# Patient Record
Sex: FEMALE | Race: WHITE | NOT HISPANIC OR LATINO | Employment: FULL TIME | URBAN - METROPOLITAN AREA
[De-identification: names, ages, dates, MRNs, and addresses within clinical notes are randomized per-mention and may not be internally consistent; named-entity substitution may affect disease eponyms.]

---

## 2022-03-29 ENCOUNTER — HOSPITAL ENCOUNTER (EMERGENCY)
Facility: CLINIC | Age: 42
Discharge: HOME | End: 2022-03-29
Attending: EMERGENCY MEDICINE | Admitting: EMERGENCY MEDICINE
Payer: COMMERCIAL

## 2022-03-29 ENCOUNTER — APPOINTMENT (OUTPATIENT)
Dept: CT IMAGING | Facility: CLINIC | Age: 42
End: 2022-03-29
Attending: EMERGENCY MEDICINE
Payer: COMMERCIAL

## 2022-03-29 VITALS
HEART RATE: 81 BPM | TEMPERATURE: 97.4 F | DIASTOLIC BLOOD PRESSURE: 93 MMHG | OXYGEN SATURATION: 97 % | SYSTOLIC BLOOD PRESSURE: 147 MMHG | RESPIRATION RATE: 16 BRPM

## 2022-03-29 DIAGNOSIS — R10.13 ABDOMINAL PAIN, EPIGASTRIC: ICD-10-CM

## 2022-03-29 LAB
ALBUMIN SERPL-MCNC: 4.1 G/DL (ref 3.4–5)
ALP SERPL-CCNC: 69 U/L (ref 40–150)
ALT SERPL W P-5'-P-CCNC: 17 U/L (ref 0–50)
ANION GAP SERPL CALCULATED.3IONS-SCNC: 6 MMOL/L (ref 3–14)
AST SERPL W P-5'-P-CCNC: 12 U/L (ref 0–45)
BASOPHILS # BLD AUTO: 0 10E3/UL (ref 0–0.2)
BASOPHILS NFR BLD AUTO: 1 %
BILIRUB SERPL-MCNC: 0.5 MG/DL (ref 0.2–1.3)
BUN SERPL-MCNC: 12 MG/DL (ref 7–30)
CALCIUM SERPL-MCNC: 9 MG/DL (ref 8.5–10.1)
CHLORIDE BLD-SCNC: 108 MMOL/L (ref 94–109)
CO2 SERPL-SCNC: 24 MMOL/L (ref 20–32)
CREAT SERPL-MCNC: 0.63 MG/DL (ref 0.52–1.04)
EOSINOPHIL # BLD AUTO: 0.1 10E3/UL (ref 0–0.7)
EOSINOPHIL NFR BLD AUTO: 1 %
ERYTHROCYTE [DISTWIDTH] IN BLOOD BY AUTOMATED COUNT: 12.2 % (ref 10–15)
GFR SERPL CREATININE-BSD FRML MDRD: >90 ML/MIN/1.73M2
GLUCOSE BLD-MCNC: 100 MG/DL (ref 70–99)
HCT VFR BLD AUTO: 45.8 % (ref 35–47)
HGB BLD-MCNC: 15.4 G/DL (ref 11.7–15.7)
IMM GRANULOCYTES # BLD: 0 10E3/UL
IMM GRANULOCYTES NFR BLD: 0 %
LIPASE SERPL-CCNC: 94 U/L (ref 73–393)
LYMPHOCYTES # BLD AUTO: 1.5 10E3/UL (ref 0.8–5.3)
LYMPHOCYTES NFR BLD AUTO: 19 %
MCH RBC QN AUTO: 29.2 PG (ref 26.5–33)
MCHC RBC AUTO-ENTMCNC: 33.6 G/DL (ref 31.5–36.5)
MCV RBC AUTO: 87 FL (ref 78–100)
MONOCYTES # BLD AUTO: 0.4 10E3/UL (ref 0–1.3)
MONOCYTES NFR BLD AUTO: 5 %
NEUTROPHILS # BLD AUTO: 6.1 10E3/UL (ref 1.6–8.3)
NEUTROPHILS NFR BLD AUTO: 74 %
NRBC # BLD AUTO: 0 10E3/UL
NRBC BLD AUTO-RTO: 0 /100
PLATELET # BLD AUTO: 295 10E3/UL (ref 150–450)
POTASSIUM BLD-SCNC: 3.9 MMOL/L (ref 3.4–5.3)
PROT SERPL-MCNC: 7.6 G/DL (ref 6.8–8.8)
RBC # BLD AUTO: 5.28 10E6/UL (ref 3.8–5.2)
SODIUM SERPL-SCNC: 138 MMOL/L (ref 133–144)
WBC # BLD AUTO: 8.1 10E3/UL (ref 4–11)

## 2022-03-29 PROCEDURE — 250N000011 HC RX IP 250 OP 636: Performed by: EMERGENCY MEDICINE

## 2022-03-29 PROCEDURE — 96361 HYDRATE IV INFUSION ADD-ON: CPT | Performed by: EMERGENCY MEDICINE

## 2022-03-29 PROCEDURE — 96374 THER/PROPH/DIAG INJ IV PUSH: CPT | Mod: 59 | Performed by: EMERGENCY MEDICINE

## 2022-03-29 PROCEDURE — 74177 CT ABD & PELVIS W/CONTRAST: CPT

## 2022-03-29 PROCEDURE — C9113 INJ PANTOPRAZOLE SODIUM, VIA: HCPCS | Performed by: FAMILY MEDICINE

## 2022-03-29 PROCEDURE — 250N000009 HC RX 250: Performed by: EMERGENCY MEDICINE

## 2022-03-29 PROCEDURE — 96375 TX/PRO/DX INJ NEW DRUG ADDON: CPT | Performed by: EMERGENCY MEDICINE

## 2022-03-29 PROCEDURE — 250N000011 HC RX IP 250 OP 636: Performed by: FAMILY MEDICINE

## 2022-03-29 PROCEDURE — 83690 ASSAY OF LIPASE: CPT | Performed by: FAMILY MEDICINE

## 2022-03-29 PROCEDURE — 99285 EMERGENCY DEPT VISIT HI MDM: CPT | Mod: 25 | Performed by: EMERGENCY MEDICINE

## 2022-03-29 PROCEDURE — 85025 COMPLETE CBC W/AUTO DIFF WBC: CPT | Performed by: FAMILY MEDICINE

## 2022-03-29 PROCEDURE — 258N000003 HC RX IP 258 OP 636: Performed by: FAMILY MEDICINE

## 2022-03-29 PROCEDURE — 36415 COLL VENOUS BLD VENIPUNCTURE: CPT | Performed by: FAMILY MEDICINE

## 2022-03-29 PROCEDURE — 99284 EMERGENCY DEPT VISIT MOD MDM: CPT | Performed by: EMERGENCY MEDICINE

## 2022-03-29 PROCEDURE — 80053 COMPREHEN METABOLIC PANEL: CPT | Performed by: FAMILY MEDICINE

## 2022-03-29 RX ORDER — IOPAMIDOL 755 MG/ML
500 INJECTION, SOLUTION INTRAVASCULAR ONCE
Status: COMPLETED | OUTPATIENT
Start: 2022-03-29 | End: 2022-03-29

## 2022-03-29 RX ORDER — SUCRALFATE 1 G/1
1 TABLET ORAL 4 TIMES DAILY
Qty: 56 TABLET | Refills: 0 | Status: SHIPPED | OUTPATIENT
Start: 2022-03-29 | End: 2022-04-12

## 2022-03-29 RX ORDER — ONDANSETRON 2 MG/ML
4 INJECTION INTRAMUSCULAR; INTRAVENOUS EVERY 30 MIN PRN
Status: DISCONTINUED | OUTPATIENT
Start: 2022-03-29 | End: 2022-03-29 | Stop reason: HOSPADM

## 2022-03-29 RX ORDER — SODIUM CHLORIDE 9 MG/ML
INJECTION, SOLUTION INTRAVENOUS CONTINUOUS
Status: DISCONTINUED | OUTPATIENT
Start: 2022-03-29 | End: 2022-03-29 | Stop reason: HOSPADM

## 2022-03-29 RX ORDER — PANTOPRAZOLE SODIUM 40 MG/1
40 TABLET, DELAYED RELEASE ORAL DAILY
Qty: 30 TABLET | Refills: 0 | Status: SHIPPED | OUTPATIENT
Start: 2022-03-29 | End: 2022-04-28

## 2022-03-29 RX ADMIN — SODIUM CHLORIDE 1000 ML: 9 INJECTION, SOLUTION INTRAVENOUS at 07:14

## 2022-03-29 RX ADMIN — PANTOPRAZOLE SODIUM 40 MG: 40 INJECTION, POWDER, FOR SOLUTION INTRAVENOUS at 07:20

## 2022-03-29 RX ADMIN — SODIUM CHLORIDE 61 ML: 9 INJECTION, SOLUTION INTRAVENOUS at 09:05

## 2022-03-29 RX ADMIN — IOPAMIDOL 98 ML: 755 INJECTION, SOLUTION INTRAVENOUS at 09:06

## 2022-03-29 RX ADMIN — ONDANSETRON 4 MG: 2 INJECTION INTRAMUSCULAR; INTRAVENOUS at 07:21

## 2022-03-29 NOTE — DISCHARGE INSTRUCTIONS
Your medical work-up and description/location of your discomfort is concerning for possibility of peptic ulcer.  Recommendations:  1.  Stop all NSAIDs.  This includes ibuprofen, Advil, Motrin, Aleve 2.  2.  Stop would be good omeprazole  3.  Protonix 40 mg daily   4.  Add Carafate 1 g 4 times daily  5.  Primary care appointment -2 weeks  6.  Antacids such as Tums, Maalox, Mylanta can be used    If not improved with above care plan your primary care provider will consider upper endoscopy for direct visualization the esophagus, stomach and the first part of your small intestine called the duodenum.    Please avoid Pepto-Bismol.  This will turn your stools black to be hard to determine if you are having melena.  (Black tarry stools that can indicate GI bleeding.)

## 2022-03-29 NOTE — ED PROVIDER NOTES
"  History     Chief Complaint   Patient presents with     Abdominal Pain     HPI  Cindy Steele is a 41 year old female who presents with abdominal pain.  Epigastric.  Onset 3 AM.  Went to bed feeling fine.  Emesis x2.  Both had small streaks of blood.  No clots.  Initial emesis also had blood streaks.  Does use NSAIDs but no heavy use recently.  No known history for gastritis or PUD.  Similar epigastric pain that lasted 3days.  This occurred with the last few weeks and then spontaneously improved.  She has had no melena.  Normal bowel movements which for her not consistent a daily basis.  Status post cholecystectomy and umbilical hernia repair.  Denies fever chills or night sweats.  No chest discomfort or shortness of breath mentioned.    Allergies:  Allergies   Allergen Reactions     Amitriptyline Difficulty breathing     Flexeril [Cyclobenzaprine] Difficulty breathing     Isometheptene Difficulty breathing     Trazodone Difficulty breathing     Latex Hives     Amoxicillin Other (See Comments)     Reports \"terrible yeast infections\"     Doxycycline Other (See Comments)     Reports \"terrible yeast infections\"       Problem List:    There are no problems to display for this patient.       Past Medical History:    No past medical history on file.    Past Surgical History:    No past surgical history on file.    Family History:    No family history on file.    Social History:  Marital Status:   [2]  Social History     Tobacco Use     Smoking status: Not on file     Smokeless tobacco: Not on file   Substance Use Topics     Alcohol use: Not on file     Drug use: Not on file        Medications:    No current outpatient medications on file.        Review of Systems   All other systems reviewed and are negative.      Physical Exam   BP: (!) 147/93  Pulse: 81  Temp: 97.4  F (36.3  C)  Resp: 16  SpO2: 97 %      Physical Exam  Vitals and nursing note reviewed.   Constitutional:       Appearance: She is not " ill-appearing.   HENT:      Head: Normocephalic.      Nose: Nose normal.   Eyes:      Conjunctiva/sclera: Conjunctivae normal.   Cardiovascular:      Rate and Rhythm: Normal rate.   Pulmonary:      Effort: Pulmonary effort is normal.   Abdominal:      General: Abdomen is flat. Bowel sounds are normal.      Palpations: Abdomen is soft. There is no hepatomegaly or splenomegaly.      Tenderness: There is abdominal tenderness in the epigastric area and periumbilical area. There is no guarding or rebound.      Hernia: No hernia is present.   Skin:     General: Skin is warm.      Capillary Refill: Capillary refill takes less than 2 seconds.      Findings: No rash.   Neurological:      General: No focal deficit present.      Mental Status: She is alert and oriented to person, place, and time.   Psychiatric:         Mood and Affect: Mood normal.         Behavior: Behavior normal.         ED Course                 Procedures                  Results for orders placed or performed during the hospital encounter of 03/29/22 (from the past 24 hour(s))   CBC with platelets differential    Narrative    The following orders were created for panel order CBC with platelets differential.  Procedure                               Abnormality         Status                     ---------                               -----------         ------                     CBC with platelets and d...[[id number]]  Abnormal            Final result                 Please view results for these tests on the individual orders.   Comprehensive metabolic panel   Result Value Ref Range    Sodium 138 133 - 144 mmol/L    Potassium 3.9 3.4 - 5.3 mmol/L    Chloride 108 94 - 109 mmol/L    Carbon Dioxide (CO2) 24 20 - 32 mmol/L    Anion Gap 6 3 - 14 mmol/L    Urea Nitrogen 12 7 - 30 mg/dL    Creatinine 0.63 0.52 - 1.04 mg/dL    Calcium 9.0 8.5 - 10.1 mg/dL    Glucose 100 (H) 70 - 99 mg/dL    Alkaline Phosphatase 69 40 - 150 U/L    AST 12 0 - 45 U/L    ALT 17 0 -  50 U/L    Protein Total 7.6 6.8 - 8.8 g/dL    Albumin 4.1 3.4 - 5.0 g/dL    Bilirubin Total 0.5 0.2 - 1.3 mg/dL    GFR Estimate >90 >60 mL/min/1.73m2   Lipase   Result Value Ref Range    Lipase 94 73 - 393 U/L   CBC with platelets and differential   Result Value Ref Range    WBC Count 8.1 4.0 - 11.0 10e3/uL    RBC Count 5.28 (H) 3.80 - 5.20 10e6/uL    Hemoglobin 15.4 11.7 - 15.7 g/dL    Hematocrit 45.8 35.0 - 47.0 %    MCV 87 78 - 100 fL    MCH 29.2 26.5 - 33.0 pg    MCHC 33.6 31.5 - 36.5 g/dL    RDW 12.2 10.0 - 15.0 %    Platelet Count 295 150 - 450 10e3/uL    % Neutrophils 74 %    % Lymphocytes 19 %    % Monocytes 5 %    % Eosinophils 1 %    % Basophils 1 %    % Immature Granulocytes 0 %    NRBCs per 100 WBC 0 <1 /100    Absolute Neutrophils 6.1 1.6 - 8.3 10e3/uL    Absolute Lymphocytes 1.5 0.8 - 5.3 10e3/uL    Absolute Monocytes 0.4 0.0 - 1.3 10e3/uL    Absolute Eosinophils 0.1 0.0 - 0.7 10e3/uL    Absolute Basophils 0.0 0.0 - 0.2 10e3/uL    Absolute Immature Granulocytes 0.0 <=0.4 10e3/uL    Absolute NRBCs 0.0 10e3/uL   CT Abdomen Pelvis w Contrast    Narrative    CT ABDOMEN PELVIS W CONTRAST 3/29/2022 9:13 AM    CLINICAL HISTORY: Epigastric pain    TECHNIQUE: CT scan of the abdomen and pelvis was performed following  injection of IV contrast. Multiplanar reformats were obtained. Dose  reduction techniques were used.  CONTRAST: [id number]    COMPARISON: None.    FINDINGS:   LOWER CHEST: Noncalcified solid medial right lower lobe nodule  measuring 0.4 x 0.2 cm (series 3, image 35).    HEPATOBILIARY: Subcentimeter hepatic dome hypodensity is too small to  be characterized. No biliary dilatation. Cholecystectomy.    PANCREAS: Homogeneous enhancement. No focal pancreatic mass. No  peripancreatic inflammatory changes. No pancreatic ductal dilatation.    SPLEEN: Normal size.    ADRENAL GLANDS: No nodules.    KIDNEYS/BLADDER: Symmetric enhancement of the kidneys. No suspicious  renal mass. No collecting system  dilatation. The urinary bladder with  thickening.    BOWEL: Unremarkable appearance of the distal esophagus, stomach, and  duodenum. No dilated loops of small bowel to suggest an obstruction.  Normal caliber appendix. Moderate colonic fecal loading. Otherwise,  unremarkable appearance of the colon.    PELVIC ORGANS: Prior hysterectomy. 2.1 cm right ovarian follicle for  which no follow-up is recommended.    ADDITIONAL FINDINGS: Normal caliber abdominal aorta. Patent portal,  splenic, and superior mesenteric veins. Patent bilateral renal veins  and no abdominopelvic lymphadenopathy. No ascites.    MUSCULOSKELETAL: Aggressive osseous lesion.      Impression    IMPRESSION:   1.  Moderate colonic fecal loading.  2.  Noncalcified right lower lobe nodule measuring 0.3 cm in main  axial diameter. If desired, a follow-up CT chest can be obtained in  one year based on Fleischner criteria.    AZIZA WESTON MD         SYSTEM ID:  [id number]       Medications   0.9% sodium chloride BOLUS (has no administration in time range)     Followed by   sodium chloride 0.9% infusion (has no administration in time range)   ondansetron (ZOFRAN) injection 4 mg (has no administration in time range)   pantoprazole (PROTONIX) IV push injection 40 mg (has no administration in time range)       Assessments & Plan (with Medical Decision Making)  41-year-old female.  Presents epigastric pain.  Experiencing postprandial epigastric discomfort.  She status post cholecystectomy years ago.  This morning had to emesis around 3 AM and had some blood streaks in the emesis.  Small fingernail-sized clot.  She has had no melena  Present with a /93.  Pulse of 81.  Patient was not orthostatic.  Skin color and tone appeared normal.  She was not pale.  Abdomen noted localized subxiphoid epigastric pain.  Did not extend to the left right upper quadrant.  There is no guarding or rebound.   Laboratory work-up identified a hemoglobin of 15.4.  Normal  platelet count.  Comprehensive metabolic panel is normal.  Hepatic studies and lipase normal.  CT of abdomen and pelvis showed no acute intra abdominal process your child for pain.  Plan: Symptoms appear to be consistent with gastritis.  She is using NSAIDs.  Advised to stop NSAIDs.  May use Tylenol.  Stop omeprazole.  Switch to Protonix 40 mg daily.  Add Carafate 1 g 4 times daily.  2 weeks of medications and recheck in the clinic.  If not improving I would recommend upper endoscopy.  If starts having melena she needs to see her doctor for hemoglobin recheck.       I have reviewed the nursing notes.    I have reviewed the findings, diagnosis, plan and need for follow up with the patient.      New Prescriptions    No medications on file       Final diagnoses:   Abdominal pain, epigastric       3/29/2022   [facility] EMERGENCY DEPT     Meir Smith,   03/29/22 1003

## 2022-03-29 NOTE — ED TRIAGE NOTES
Patient presents with epigastric pain that started around 0300 tonight. Endorses vomiting x2, states she noticed blood in the emesis. No fevers.

## 2023-01-09 ENCOUNTER — APPOINTMENT (OUTPATIENT)
Dept: GENERAL RADIOLOGY | Facility: CLINIC | Age: 43
End: 2023-01-09
Attending: EMERGENCY MEDICINE
Payer: OTHER MISCELLANEOUS

## 2023-01-09 ENCOUNTER — HOSPITAL ENCOUNTER (EMERGENCY)
Facility: CLINIC | Age: 43
Discharge: HOME | End: 2023-01-09
Attending: EMERGENCY MEDICINE | Admitting: EMERGENCY MEDICINE
Payer: OTHER MISCELLANEOUS

## 2023-01-09 VITALS
SYSTOLIC BLOOD PRESSURE: 128 MMHG | WEIGHT: 206 LBS | RESPIRATION RATE: 20 BRPM | TEMPERATURE: 98.3 F | DIASTOLIC BLOOD PRESSURE: 88 MMHG | OXYGEN SATURATION: 98 % | HEART RATE: 74 BPM

## 2023-01-09 DIAGNOSIS — S80.01XA PATELLAR CONTUSION, RIGHT, INITIAL ENCOUNTER: ICD-10-CM

## 2023-01-09 PROCEDURE — 73562 X-RAY EXAM OF KNEE 3: CPT | Mod: RT

## 2023-01-09 PROCEDURE — 99283 EMERGENCY DEPT VISIT LOW MDM: CPT | Performed by: EMERGENCY MEDICINE

## 2023-01-09 NOTE — DISCHARGE INSTRUCTIONS
X-rays show no internal derangement or fracture.  Her examination is consistent with a direct blow when falling causing soft tissue swelling and a contusion to the patella (kneecap).  No limitation in activity.  Will be tender to bear weight directly on the knee such as kneeling.  Would recommend applying ice as needed for swelling and pain reduction.  May return to work without restrictions.

## 2023-01-09 NOTE — ED PROVIDER NOTES
"  History     Chief Complaint   Patient presents with     Knee Pain     HPI   Registered as Worker's Compensation related injury.    Cindy Steele is a 42 year old female who presents with acute right knee pain.  She fell over the weekend was flexed and degrees landing right on the patella.  She has noted some soft tissue swelling with bruising and also feels a slight change in the contour to the patella.  She will bear full weight.    Allergies:  Allergies   Allergen Reactions     Amitriptyline Difficulty breathing     Flexeril [Cyclobenzaprine] Difficulty breathing     Isometheptene Difficulty breathing     Trazodone Difficulty breathing     Latex Hives     Amoxicillin Other (See Comments)     Reports \"terrible yeast infections\"     Doxycycline Other (See Comments)     Reports \"terrible yeast infections\"       Problem List:    There are no problems to display for this patient.       Past Medical History:    History reviewed. No pertinent past medical history.    Past Surgical History:    History reviewed. No pertinent surgical history.    Family History:    No family history on file.    Social History:  Marital Status:   [2]        Medications:    No current outpatient medications on file.        Review of Systems   All other systems reviewed and are negative.      Physical Exam   BP: (!) 131/96  Pulse: 73  Temp: 98.3  F (36.8  C)  Resp: 20  Weight: 93.4 kg (206 lb)  SpO2: 98 %      Physical Exam  Vitals and nursing note reviewed.   Constitutional:       Appearance: She is not ill-appearing.   HENT:      Head: Normocephalic.      Nose: Nose normal.   Eyes:      Conjunctiva/sclera: Conjunctivae normal.   Cardiovascular:      Rate and Rhythm: Normal rate.   Pulmonary:      Effort: Pulmonary effort is normal.   Musculoskeletal:      Comments: Right knee:  Prepatellar contusion  Tenderness palpating over the central patella  Patella is tracking properly  Full flexion and extension  Ligaments intact  No " recognized joint effusion     Skin:     General: Skin is warm.      Capillary Refill: Capillary refill takes less than 2 seconds.      Findings: No rash.   Neurological:      General: No focal deficit present.      Mental Status: She is alert and oriented to person, place, and time.   Psychiatric:         Mood and Affect: Mood normal.         Behavior: Behavior normal.         ED Course                 Procedures                  Results for orders placed or performed during the hospital encounter of 01/09/23 (from the past 24 hour(s))   XR Knee Right 3 Views    Narrative    KNEE RIGHT THREE VIEWS January 9, 2023 9:57 AM    INDICATION: Fall related injury.    COMPARISON: None available.       Impression    IMPRESSION: Normal joint spaces and alignment. No definite fracture.  No right knee joint effusion.       MALISSA MUSTAFA MD         SYSTEM ID:  [id number]       Medications - No data to display    Assessments & Plan (with Medical Decision Making)  Fall at home over the weekend and ice  Isolate injury to the right knee.  Complaining of pain over the prepatellar area.  Able to bear full weight.  Examination none noted and no joint effusion.  There is some bruising over the patella.  The central patella on exam was mildly tender.  No significant pain with compression of the patella to the femoral condyles.  Ligament assessment for MCL, PCL, ACL, LCL intact.  Displayed normal range of motion of the knee.  Full weightbearing.  X-rays identified normal joint space and alignment with no signs for fracture and joint effusion.  Exam appears to be consistent with soft tissue contusion that is prepatellar.  Discharge home with weightbearing activity as tolerated.  She can apply ice to reduce swelling and discomfort as needed.  Work note provided for today.       I have reviewed the nursing notes.    I have reviewed the findings, diagnosis, plan and need for follow up with the patient.            There are no discharge  medications for this patient.      Final diagnoses:   Patellar contusion, right, initial encounter       1/9/2023   [facility] EMERGENCY DEPT     Meir Smith, DO  01/09/23 1049       Meir Smith, DO  01/09/23 1115

## 2023-01-09 NOTE — LETTER
January 9, 2023      To Whom It May Concern:      Cindy Steele was seen in our Emergency Department today, 01/09/23.  Patient was seen for a nonwork related fall.  She sustained a contusion to the right knee/patella.  She may return to work tomorrow without restrictions other than should should not be doing a lot of direct pressure to the kneecap by avoiding kneeling.      Sincerely,        Meir Smith Dr. [facility] ED Staff Physician

## 2023-01-09 NOTE — LETTER
January 9, 2023      To Whom It May Concern:      Cindy Steele was seen in our Emergency Department today, 01/09/23.   Patient was seen for a fall that is work-related.  She sustained a contusion to the right knee both soft tissue and patella.  Fortunately x-rays showed no signs for any fracture or change in alignment.  Examination noted no internal derangement to suggest any ligament disruption.  She is allowed to bear full weight.  Limitation would be to avoid putting direct pressure on the knee such as when kneeling.  She can use ibuprofen or Tylenol for discomfort and apply ice as needed for swelling and discomfort.        Sincerely,    Meir Smith Dr. [facility] ED Staff Physician

## 2023-01-09 NOTE — ED TRIAGE NOTES
"Patient states she fell on her right knee at work on Saturday. She feels a \"ridge\" in her knee and pain is also to the back of knee.     Triage Assessment     Row Name 01/09/23 0944       Triage Assessment (Adult)    Airway WDL WDL       Respiratory WDL    Respiratory WDL WDL       Skin Circulation/Temperature WDL    Skin Circulation/Temperature WDL WDL       Cardiac WDL    Cardiac WDL WDL              "

## 2023-01-19 ENCOUNTER — OFFICE VISIT (OUTPATIENT)
Dept: URGENT CARE | Facility: URGENT CARE | Age: 43
End: 2023-01-19
Payer: COMMERCIAL

## 2023-01-19 VITALS
BODY MASS INDEX: 32.82 KG/M2 | OXYGEN SATURATION: 98 % | TEMPERATURE: 98.2 F | SYSTOLIC BLOOD PRESSURE: 138 MMHG | WEIGHT: 204.2 LBS | HEIGHT: 66 IN | DIASTOLIC BLOOD PRESSURE: 89 MMHG | HEART RATE: 83 BPM

## 2023-01-19 DIAGNOSIS — H10.32 ACUTE CONJUNCTIVITIS OF LEFT EYE, UNSPECIFIED ACUTE CONJUNCTIVITIS TYPE: Primary | ICD-10-CM

## 2023-01-19 PROCEDURE — 99203 OFFICE O/P NEW LOW 30 MIN: CPT

## 2023-01-19 RX ORDER — LISINOPRIL 5 MG/1
1 TABLET ORAL DAILY
COMMUNITY
Start: 2022-04-04

## 2023-01-19 RX ORDER — BIOTIN 10000 MCG
CAPSULE ORAL
COMMUNITY

## 2023-01-19 RX ORDER — VITAMIN B COMPLEX
1 TABLET ORAL DAILY
COMMUNITY

## 2023-01-19 RX ORDER — CIPROFLOXACIN HYDROCHLORIDE 3.5 MG/ML
1 SOLUTION/ DROPS TOPICAL 4 TIMES DAILY
Qty: 1 ML | Refills: 0 | Status: SHIPPED | OUTPATIENT
Start: 2023-01-19 | End: 2023-01-24

## 2023-01-19 RX ORDER — ESOMEPRAZOLE MAGNESIUM 40 MG/1
40 CAPSULE, DELAYED RELEASE ORAL
COMMUNITY
Start: 2021-07-22

## 2023-01-19 RX ORDER — ALBUTEROL SULFATE 90 UG/1
1-2 AEROSOL, METERED RESPIRATORY (INHALATION)
COMMUNITY
Start: 2022-08-24

## 2023-01-19 NOTE — LETTER
January 19, 2023      Cindy Steele  [address] [place] [address]        To Whom It May Concern:    Cindy Steele  was seen on January 19, 2023.  Please excuse her from work until January 20th due to illness.        Sincerely,        KARINA Augustin CNP

## 2023-01-19 NOTE — PATIENT INSTRUCTIONS
Use the antibiotic eyedrops as prescribed and finish the full course even if symptoms improve.  Wear glasses until you have completed the five days of treatment.

## 2023-01-19 NOTE — PROGRESS NOTES
"ASSESSMENT:  (H10.32) Acute conjunctivitis of left eye, unspecified acute conjunctivitis type  (primary encounter diagnosis)  Plan: ciprofloxacin (CILOXAN) 0.3 % ophthalmic         solution    PLAN:  Conjunctivitis caused by infection patient instructions discussed and provided.  Informed the patient to use the antibiotic eyedrops as prescribed and finish the full course even if symptoms improve.  We also discussed the need for her to wear her glasses until she has completed the full 5 days of treatment.  Work note provided.  Discussed the need to return to clinic with any new or worsening symptoms.  Patient acknowledged her understanding of the above plan.    Neto Blue, KARINA CNP    SUBJECTIVE:  Cindy Steele is a 42 year old female who presents complaining of moderate left eye discomfort, itching and watering for 2 day(s).  The patient reports having her left eye swollen shut today with hard crust over it. She indicates that she had difficulty removing her left contact lens yesterday.  She reported that she was successful late last night and removing the left contact lens.  She denies any vision changes.  Details:  Associated Signs and Syptoms: none  Treatment measures tried include: pink eye relief drops  Contact wearer : Yes     ROS:  ROS neg other than the symptoms noted above in the HPI.    OBJECTIVE:  /89 (BP Location: Left arm, Patient Position: Sitting, Cuff Size: Adult Large)   Pulse 83   Temp 98.2  F (36.8  C) (Tympanic)   Ht 1.676 m (5' 6\")   Wt 92.6 kg (204 lb 3.2 oz)   SpO2 98%   BMI 32.96 kg/m    General: no acute distress  Left eye exam: lids normal; PERRL, EOM's intact; mild conjunctival injection and increased tearing compared to right eye.  Patient is currently wearing glasses  "

## 2023-02-12 ENCOUNTER — HEALTH MAINTENANCE LETTER (OUTPATIENT)
Age: 43
End: 2023-02-12

## 2023-08-13 ENCOUNTER — HEALTH MAINTENANCE LETTER (OUTPATIENT)
Age: 43
End: 2023-08-13

## 2024-03-10 ENCOUNTER — HEALTH MAINTENANCE LETTER (OUTPATIENT)
Age: 44
End: 2024-03-10

## 2024-03-29 ENCOUNTER — HOSPITAL ENCOUNTER (EMERGENCY)
Facility: CLINIC | Age: 44
Discharge: HOME | End: 2024-03-29
Attending: STUDENT IN AN ORGANIZED HEALTH CARE EDUCATION/TRAINING PROGRAM | Admitting: STUDENT IN AN ORGANIZED HEALTH CARE EDUCATION/TRAINING PROGRAM
Payer: COMMERCIAL

## 2024-03-29 ENCOUNTER — APPOINTMENT (OUTPATIENT)
Dept: CT IMAGING | Facility: CLINIC | Age: 44
End: 2024-03-29
Attending: STUDENT IN AN ORGANIZED HEALTH CARE EDUCATION/TRAINING PROGRAM
Payer: COMMERCIAL

## 2024-03-29 VITALS
SYSTOLIC BLOOD PRESSURE: 141 MMHG | RESPIRATION RATE: 16 BRPM | HEART RATE: 80 BPM | OXYGEN SATURATION: 100 % | TEMPERATURE: 97.5 F | BODY MASS INDEX: 32.28 KG/M2 | DIASTOLIC BLOOD PRESSURE: 98 MMHG | WEIGHT: 200 LBS

## 2024-03-29 DIAGNOSIS — M54.6 ACUTE BILATERAL THORACIC BACK PAIN: ICD-10-CM

## 2024-03-29 PROCEDURE — 72131 CT LUMBAR SPINE W/O DYE: CPT

## 2024-03-29 PROCEDURE — 99284 EMERGENCY DEPT VISIT MOD MDM: CPT | Mod: 25 | Performed by: STUDENT IN AN ORGANIZED HEALTH CARE EDUCATION/TRAINING PROGRAM

## 2024-03-29 PROCEDURE — 99284 EMERGENCY DEPT VISIT MOD MDM: CPT | Performed by: STUDENT IN AN ORGANIZED HEALTH CARE EDUCATION/TRAINING PROGRAM

## 2024-03-29 PROCEDURE — 250N000013 HC RX MED GY IP 250 OP 250 PS 637: Performed by: STUDENT IN AN ORGANIZED HEALTH CARE EDUCATION/TRAINING PROGRAM

## 2024-03-29 RX ORDER — IBUPROFEN 600 MG/1
600 TABLET, FILM COATED ORAL ONCE
Status: COMPLETED | OUTPATIENT
Start: 2024-03-29 | End: 2024-03-29

## 2024-03-29 RX ORDER — OXYCODONE HYDROCHLORIDE 5 MG/1
5 TABLET ORAL EVERY 6 HOURS PRN
Qty: 12 TABLET | Refills: 0 | Status: SHIPPED | OUTPATIENT
Start: 2024-03-29 | End: 2024-04-01

## 2024-03-29 RX ORDER — ACETAMINOPHEN 500 MG
1000 TABLET ORAL ONCE
Status: COMPLETED | OUTPATIENT
Start: 2024-03-29 | End: 2024-03-29

## 2024-03-29 RX ORDER — METHYLPREDNISOLONE 4 MG
TABLET, DOSE PACK ORAL
Qty: 21 TABLET | Refills: 0 | Status: SHIPPED | OUTPATIENT
Start: 2024-03-29

## 2024-03-29 RX ORDER — LORAZEPAM 1 MG/1
0.5 TABLET ORAL EVERY 6 HOURS PRN
Qty: 5 TABLET | Refills: 0 | Status: SHIPPED | OUTPATIENT
Start: 2024-03-29 | End: 2024-04-04

## 2024-03-29 RX ADMIN — IBUPROFEN 600 MG: 600 TABLET, FILM COATED ORAL at 04:39

## 2024-03-29 RX ADMIN — ACETAMINOPHEN 1000 MG: 500 TABLET ORAL at 04:39

## 2024-03-29 ASSESSMENT — ENCOUNTER SYMPTOMS
NECK STIFFNESS: 0
APPETITE CHANGE: 0
BACK PAIN: 1
DIZZINESS: 0
ACTIVITY CHANGE: 0
NAUSEA: 0
FEVER: 0
SORE THROAT: 0
SHORTNESS OF BREATH: 0
HEADACHES: 0
RHINORRHEA: 0
FATIGUE: 0
HEMATURIA: 0
DYSURIA: 0
ARTHRALGIAS: 0
DIARRHEA: 0
COUGH: 0
VOMITING: 0
CHILLS: 0
ABDOMINAL PAIN: 0
MYALGIAS: 0
EYE REDNESS: 0

## 2024-03-29 ASSESSMENT — COLUMBIA-SUICIDE SEVERITY RATING SCALE - C-SSRS
1. IN THE PAST MONTH, HAVE YOU WISHED YOU WERE DEAD OR WISHED YOU COULD GO TO SLEEP AND NOT WAKE UP?: NO
2. HAVE YOU ACTUALLY HAD ANY THOUGHTS OF KILLING YOURSELF IN THE PAST MONTH?: NO
6. HAVE YOU EVER DONE ANYTHING, STARTED TO DO ANYTHING, OR PREPARED TO DO ANYTHING TO END YOUR LIFE?: NO

## 2024-03-29 ASSESSMENT — ACTIVITIES OF DAILY LIVING (ADL)
ADLS_ACUITY_SCORE: 35
ADLS_ACUITY_SCORE: 35

## 2024-03-29 NOTE — ED TRIAGE NOTES
"Patient injured her back Monday while shoveling snow. She reports it \"popping\" this morning with increased pain.     Triage Assessment (Adult)       Row Name 03/29/24 0344          Triage Assessment    Airway WDL WDL        Respiratory WDL    Respiratory WDL WDL        Skin Circulation/Temperature WDL    Skin Circulation/Temperature WDL WDL        Cardiac WDL    Cardiac WDL WDL        Peripheral/Neurovascular WDL    Peripheral Neurovascular WDL WDL        Cognitive/Neuro/Behavioral WDL    Cognitive/Neuro/Behavioral WDL WDL                     "

## 2024-03-29 NOTE — ED PROVIDER NOTES
"  History     Chief Complaint   Patient presents with    Back Pain     HPI  Cindy Steele is a 43 year old female senting with back pain.  She notes that on Monday she was shoveling snow when she heard a pop in her back.  She has been stiff otherwise wise since then however yesterday she felt better and try to shave her legs and when trying to stand up again for another loud pop again.  She tried to rest for the majority of yesterday in the evening and while she tried to stand at this point to go to work she had significant pain in her lumbar spine.  She has no loss of sensation of her groin or loss of bowel or bladder habits.  She has no numbness tingling of her extremities.  Pain is localized to the lumbar area.    Allergies:  Allergies   Allergen Reactions    Amitriptyline Difficulty breathing    Flexeril [Cyclobenzaprine] Difficulty breathing    Isometheptene Difficulty breathing    Trazodone Difficulty breathing    Latex Hives    Amoxicillin Other (See Comments)     Reports \"terrible yeast infections\"    Doxycycline Other (See Comments)     Reports \"terrible yeast infections\"       Problem List:    There are no problems to display for this patient.       Past Medical History:    No past medical history on file.    Past Surgical History:    No past surgical history on file.    Family History:    No family history on file.    Social History:  Marital Status:   [2]  Social History     Tobacco Use    Smoking status: Never    Smokeless tobacco: Never    Tobacco comments:     PT VAPE   Substance Use Topics    Alcohol use: Yes    Drug use: Never        Medications:    LORazepam (ATIVAN) 1 MG tablet  methylPREDNISolone (MEDROL DOSEPAK) 4 MG tablet therapy pack  oxyCODONE (ROXICODONE) 5 MG tablet  albuterol (PROAIR HFA/PROVENTIL HFA/VENTOLIN HFA) 108 (90 Base) MCG/ACT inhaler  B Complex Vitamins (VITAMIN-B COMPLEX) TABS  Biotin 10 MG CAPS  cholecalciferol 25 MCG (1000 UT) TABS  cyanocobalamin (VITAMIN B-12) 2500 " MCG SUBL sublingual tablet  esomeprazole (NEXIUM) 40 MG DR capsule  lisinopril (ZESTRIL) 5 MG tablet          Review of Systems   Constitutional:  Negative for activity change, appetite change, chills, fatigue and fever.   HENT:  Negative for congestion, rhinorrhea and sore throat.    Eyes:  Negative for redness.   Respiratory:  Negative for cough and shortness of breath.    Cardiovascular:  Negative for chest pain.   Gastrointestinal:  Negative for abdominal pain, diarrhea, nausea and vomiting.   Genitourinary:  Negative for dysuria and hematuria.   Musculoskeletal:  Positive for back pain. Negative for arthralgias, myalgias and neck stiffness.   Skin:  Negative for rash.   Neurological:  Negative for dizziness and headaches.   All other systems reviewed and are negative.      Physical Exam   BP: (!) 141/98  Pulse: 80  Temp: 97.5  F (36.4  C)  Resp: 16  Weight: 90.7 kg (200 lb)  SpO2: 100 %      Physical Exam  Constitutional:       General: She is not in acute distress.     Appearance: Normal appearance. She is well-developed.   HENT:      Head: Normocephalic and atraumatic.   Eyes:      General: No scleral icterus.     Conjunctiva/sclera: Conjunctivae normal.   Cardiovascular:      Rate and Rhythm: Normal rate and regular rhythm.   Pulmonary:      Effort: Pulmonary effort is normal. No respiratory distress.      Breath sounds: Normal breath sounds. No wheezing or rales.   Abdominal:      General: Abdomen is flat. Bowel sounds are normal. There is no distension.      Palpations: Abdomen is soft.      Tenderness: There is no abdominal tenderness. There is no guarding.   Musculoskeletal:        Arms:       Cervical back: Normal range of motion and neck supple.      Comments: Tenderness to palpation and of the L1-L3 area as well as paraspinal muscles in that area.  No overlying skin changes or masses or step-off deformities.   Skin:     General: Skin is warm and dry.      Findings: No rash.   Neurological:       General: No focal deficit present.      Mental Status: She is alert and oriented to person, place, and time.      Cranial Nerves: No cranial nerve deficit.      Sensory: No sensory deficit.      Motor: No weakness.      Coordination: Coordination normal.      Gait: Gait normal.   Psychiatric:         Mood and Affect: Mood normal.         Behavior: Behavior normal.         ED Course        Procedures             Results for orders placed or performed during the hospital encounter of 03/29/24 (from the past 24 hour(s))   CT Lumbar Spine w/o Contrast    Narrative    EXAM: CT LUMBAR SPINE W/O CONTRAST  LOCATION: [facility]  DATE: 3/29/2024    INDICATION: Severe lumbar spine pain after a popping sound.  COMPARISON: CT abdomen and pelvis dated 03/29/2022.  TECHNIQUE: Routine CT Lumbar Spine without IV contrast. Multiplanar reformats. Dose reduction techniques were used.     FINDINGS:  VERTEBRA: Grade I retrolisthesis of L3 on L4 and L4 on L5. Borderline bony demineralization with an average Hounsfield unit of 137 at L1. Findings can be seen with osteopenia. Vertebral body heights are preserved. No evidence of an acute displaced   fracture.     CANAL/FORAMINA: No high-grade spinal canal stenosis. At least mild bilateral neural foraminal narrowing at L4-L5 and left-sided narrowing at L3-L4.    PARASPINAL: No acute extraspinal abnormality. Scattered atherosclerotic calcification in the abdominal aorta and branching vessels. Degenerative changes of the sacroiliac joints. Postsurgical changes of a cholecystectomy.      Impression    IMPRESSION:  1.  No evidence of an acute osseous abnormality of the lumbar spine.  2.  Degenerative changes, as described.  3.  Borderline bony demineralization. Consider confirmation with a DEXA scan.                     Medications   ibuprofen (ADVIL/MOTRIN) tablet 600 mg (600 mg Oral $Given 3/29/24 8185)   acetaminophen (TYLENOL) tablet 1,000 mg (1,000 mg Oral  $Given 3/29/24 6791)       Assessments & Plan (with Medical Decision Making)     I have reviewed the nursing notes.    I have reviewed the findings, diagnosis, plan and need for follow up with the patient.      Medical Decision Making  43-year-old female presenting with low back pain.  No radiculopathy, no bowel or bladder habit losses, no changes in sensation.  Spinal process tenderness on palpation of L1-L3 as well as paraspinal muscle tenderness.  No history of IV drug use, chronic steroid use, immunosuppression, endocrinopathies or malignancies.  No history of recent trauma.  Likely disc herniation versus acute fracture versus other medical or surgical emergencies that are considered.  CT imaging without acute findings requiring any emergent neurosurgery consult.  Patient okay with taking ibuprofen Tylenol as her opiates cause migraines.  Discussed tizanidine/Flexeril which she has allergies to Flexeril overdose mild improvement with Ativan.  She is currently driving therefore would not provide any narcotics at this time but did provide patient with 600 mg ibuprofen and 1000 mg of oral Tylenol.  No new findings and patient pain remained stable.  Patient discharged home with return precautions and supportive care measures.    New Prescriptions    LORAZEPAM (ATIVAN) 1 MG TABLET    Take 0.5 tablets (0.5 mg) by mouth every 6 hours as needed for muscle spasms    METHYLPREDNISOLONE (MEDROL DOSEPAK) 4 MG TABLET THERAPY PACK    Follow Package Directions    OXYCODONE (ROXICODONE) 5 MG TABLET    Take 1 tablet (5 mg) by mouth every 6 hours as needed       Final diagnoses:   Acute bilateral thoracic back pain       3/29/2024   [facility] EMERGENCY DEPT       Mateus Amor MD  03/29/24 4098

## 2024-03-29 NOTE — Clinical Note
Cindy Steele was seen and treated in our emergency department on 3/29/2024.  She may return to work on 04/02/2024.       If you have any questions or concerns, please don't hesitate to call.      Mateus Amor MD

## 2024-03-29 NOTE — DISCHARGE INSTRUCTIONS
You presented today with back pain.  Imaging not show any acute fractures or acute changes in disc height concerning for any significant pathology however he likely suffering a disc herniation that will require outpatient follow-up with your primary care doctor in 4 to 6 weeks for reevaluation.  This may require further imaging such as MRIs and neurosurgery consult if there is significant recurrence..  You are provided with oxycodone to be used only for severe pain.  I also provided you with a Medrol Dosepak to be taken this morning after eating breakfast.  Ativan is also provided please take half a tablet for pain and muscle spasms.  Otherwise mainstay of treatment should be occluded rest physical therapy after few days of rest 3 your primary care doctor with associated ibuprofen 600 mg p.o. every 6 hours daily with Tylenol 1000 mg every 6 hours do not exceed 1000 mg or more than 4000 mg/day

## 2024-04-04 ENCOUNTER — HOSPITAL ENCOUNTER (EMERGENCY)
Facility: CLINIC | Age: 44
Discharge: HOME | End: 2024-04-04
Attending: FAMILY MEDICINE | Admitting: FAMILY MEDICINE
Payer: COMMERCIAL

## 2024-04-04 VITALS
RESPIRATION RATE: 20 BRPM | DIASTOLIC BLOOD PRESSURE: 92 MMHG | BODY MASS INDEX: 32.3 KG/M2 | TEMPERATURE: 98.2 F | OXYGEN SATURATION: 97 % | HEIGHT: 66 IN | HEART RATE: 79 BPM | WEIGHT: 201 LBS | SYSTOLIC BLOOD PRESSURE: 131 MMHG

## 2024-04-04 DIAGNOSIS — M54.50 ACUTE LOW BACK PAIN WITHOUT SCIATICA, UNSPECIFIED BACK PAIN LATERALITY: ICD-10-CM

## 2024-04-04 PROCEDURE — 99283 EMERGENCY DEPT VISIT LOW MDM: CPT | Performed by: FAMILY MEDICINE

## 2024-04-04 PROCEDURE — 99284 EMERGENCY DEPT VISIT MOD MDM: CPT | Performed by: FAMILY MEDICINE

## 2024-04-04 RX ORDER — LORAZEPAM 1 MG/1
0.5 TABLET ORAL EVERY 6 HOURS PRN
Qty: 5 TABLET | Refills: 0 | Status: SHIPPED | OUTPATIENT
Start: 2024-04-04

## 2024-04-04 ASSESSMENT — COLUMBIA-SUICIDE SEVERITY RATING SCALE - C-SSRS
2. HAVE YOU ACTUALLY HAD ANY THOUGHTS OF KILLING YOURSELF IN THE PAST MONTH?: NO
6. HAVE YOU EVER DONE ANYTHING, STARTED TO DO ANYTHING, OR PREPARED TO DO ANYTHING TO END YOUR LIFE?: NO
1. IN THE PAST MONTH, HAVE YOU WISHED YOU WERE DEAD OR WISHED YOU COULD GO TO SLEEP AND NOT WAKE UP?: NO

## 2024-04-04 ASSESSMENT — ACTIVITIES OF DAILY LIVING (ADL): ADLS_ACUITY_SCORE: 35

## 2024-04-04 NOTE — ED PROVIDER NOTES
"  History     Chief Complaint   Patient presents with    Back Pain     HPI  Cindy Steele is a 43 year old female who presents with an exacerbation of her back pain again.  Patient was seen last week for this, had a CT scan which was unremarkable.  She was sent home on steroids and pain meds.  She was improving and then yesterday she went to bend over to pick something and then her back started hurting her more again.  Patient denies any bowel or bladder incontinence, denies any saddle anesthesia, denies any extremity weakness or numbness.  Patient did not have any pain meds to try, she has been taking over-the-counter ibuprofen and Tylenol with no effect.  Patient cannot get into see her doctor for another couple of weeks.    Allergies:  Allergies   Allergen Reactions    Amitriptyline Difficulty breathing    Flexeril [Cyclobenzaprine] Difficulty breathing    Isometheptene Difficulty breathing    Trazodone Difficulty breathing    Latex Hives    Amoxicillin Other (See Comments)     Reports \"terrible yeast infections\"    Doxycycline Other (See Comments)     Reports \"terrible yeast infections\"       Problem List:    There are no problems to display for this patient.       Past Medical History:    No past medical history on file.    Past Surgical History:    No past surgical history on file.    Family History:    No family history on file.    Social History:  Marital Status:   [2]  Social History     Tobacco Use    Smoking status: Never    Smokeless tobacco: Never    Tobacco comments:     PT VAPE   Substance Use Topics    Alcohol use: Yes    Drug use: Never        Medications:    LORazepam (ATIVAN) 1 MG tablet  methylPREDNISolone (MEDROL DOSEPAK) 4 MG tablet therapy pack  albuterol (PROAIR HFA/PROVENTIL HFA/VENTOLIN HFA) 108 (90 Base) MCG/ACT inhaler  B Complex Vitamins (VITAMIN-B COMPLEX) TABS  Biotin 10 MG CAPS  cholecalciferol 25 MCG (1000 UT) TABS  cyanocobalamin (VITAMIN B-12) 2500 MCG SUBL sublingual " "tablet  esomeprazole (NEXIUM) 40 MG DR capsule  lisinopril (ZESTRIL) 5 MG tablet          Review of Systems   All other systems reviewed and are negative.      Physical Exam   BP: (!) 131/92  Pulse: 79  Temp: 98.2  F (36.8  C)  Resp: 20  Height: 167.6 cm (5' 6\")  Weight: 91.2 kg (201 lb)  SpO2: 97 %      Physical Exam  Vitals and nursing note reviewed.   Constitutional:       General: She is not in acute distress.     Appearance: Normal appearance. She is not ill-appearing.   Cardiovascular:      Pulses: Normal pulses.   Musculoskeletal:      Thoracic back: Tenderness and bony tenderness present. No signs of trauma or spasms. Normal range of motion.      Lumbar back: Tenderness present. No bony tenderness.        Back:    Neurological:      General: No focal deficit present.      Mental Status: She is alert.      Sensory: No sensory deficit.      Motor: No weakness.      Deep Tendon Reflexes: Reflexes normal.         ED Course        Procedures         Patient presents with exacerbation of her back pain.  This is nontraumatic.  She just had a CT scan last week which was unremarkable.  I think the neck step is either physical therapy to work on her back or possibly an outpatient MRI.  It is after hours of this not something that we can get ordered in the emergency department now.  I recommend that she contact her primary care doctor tomorrow to see about getting this MRI set up.  In the meantime we will send the patient home with some muscle relaxants.  She states that she did not want any narcotics.  Patient was given a note to be off work the next couple of days.    Assessments & Plan (with Medical Decision Making)  Back pain exacerbation     I have reviewed the nursing notes.    I have reviewed the findings, diagnosis, plan and need for follow up with the patient.        4/4/2024   [facility] EMERGENCY DEPT       Deandre Rolle MD  04/04/24 0116    "

## 2024-04-04 NOTE — Clinical Note
Cindy Steele was seen and treated in our emergency department on 4/4/2024.  She may return to work on 04/08/2024.       If you have any questions or concerns, please don't hesitate to call.      Deandre Rolle MD